# Patient Record
Sex: MALE | Race: WHITE | NOT HISPANIC OR LATINO | URBAN - METROPOLITAN AREA
[De-identification: names, ages, dates, MRNs, and addresses within clinical notes are randomized per-mention and may not be internally consistent; named-entity substitution may affect disease eponyms.]

---

## 2017-04-21 ENCOUNTER — IMPORTED ENCOUNTER (OUTPATIENT)
Dept: URBAN - METROPOLITAN AREA CLINIC 9 | Facility: CLINIC | Age: 52
End: 2017-04-21

## 2017-05-23 ENCOUNTER — IMPORTED ENCOUNTER (OUTPATIENT)
Dept: URBAN - METROPOLITAN AREA CLINIC 9 | Facility: CLINIC | Age: 52
End: 2017-05-23

## 2017-11-21 ENCOUNTER — IMPORTED ENCOUNTER (OUTPATIENT)
Dept: URBAN - METROPOLITAN AREA CLINIC 9 | Facility: CLINIC | Age: 52
End: 2017-11-21

## 2018-05-22 ENCOUNTER — IMPORTED ENCOUNTER (OUTPATIENT)
Dept: URBAN - METROPOLITAN AREA CLINIC 9 | Facility: CLINIC | Age: 53
End: 2018-05-22

## 2018-05-29 ENCOUNTER — IMPORTED ENCOUNTER (OUTPATIENT)
Dept: URBAN - METROPOLITAN AREA CLINIC 9 | Facility: CLINIC | Age: 53
End: 2018-05-29

## 2018-11-20 ENCOUNTER — IMPORTED ENCOUNTER (OUTPATIENT)
Dept: URBAN - METROPOLITAN AREA CLINIC 9 | Facility: CLINIC | Age: 53
End: 2018-11-20

## 2019-05-30 ENCOUNTER — IMPORTED ENCOUNTER (OUTPATIENT)
Dept: URBAN - METROPOLITAN AREA CLINIC 9 | Facility: CLINIC | Age: 54
End: 2019-05-30

## 2019-06-21 ENCOUNTER — IMPORTED ENCOUNTER (OUTPATIENT)
Dept: URBAN - METROPOLITAN AREA CLINIC 9 | Facility: CLINIC | Age: 54
End: 2019-06-21

## 2019-12-17 ENCOUNTER — IMPORTED ENCOUNTER (OUTPATIENT)
Dept: URBAN - METROPOLITAN AREA CLINIC 9 | Facility: CLINIC | Age: 54
End: 2019-12-17

## 2020-06-30 ENCOUNTER — IMPORTED ENCOUNTER (OUTPATIENT)
Dept: URBAN - METROPOLITAN AREA CLINIC 9 | Facility: CLINIC | Age: 55
End: 2020-06-30

## 2020-11-25 ENCOUNTER — IMPORTED ENCOUNTER (OUTPATIENT)
Dept: URBAN - METROPOLITAN AREA CLINIC 9 | Facility: CLINIC | Age: 55
End: 2020-11-25

## 2021-01-12 ENCOUNTER — IMPORTED ENCOUNTER (OUTPATIENT)
Dept: URBAN - METROPOLITAN AREA CLINIC 9 | Facility: CLINIC | Age: 56
End: 2021-01-12

## 2021-07-13 ENCOUNTER — IMPORTED ENCOUNTER (OUTPATIENT)
Dept: URBAN - METROPOLITAN AREA CLINIC 9 | Facility: CLINIC | Age: 56
End: 2021-07-13

## 2021-09-30 ENCOUNTER — IMPORTED ENCOUNTER (OUTPATIENT)
Dept: URBAN - METROPOLITAN AREA CLINIC 9 | Facility: CLINIC | Age: 56
End: 2021-09-30

## 2021-10-18 ASSESSMENT — KERATOMETRY
OD_K1POWER_DIOPTERS: 42
OS_K1POWER_DIOPTERS: 42.25
OS_K1POWER_DIOPTERS: 42.25
OD_K2POWER_DIOPTERS: 42.625
OS_K2POWER_DIOPTERS: 43
OD_K2POWER_DIOPTERS: 42.75
OS_AXISANGLE2_DEGREES: 113
OD_K2POWER_DIOPTERS: 42.75
OS_AXISANGLE_DEGREES: 23
OD_AXISANGLE_DEGREES: 155
OD_AXISANGLE2_DEGREES: 66
OS_AXISANGLE_DEGREES: 21
OD_AXISANGLE_DEGREES: 150
OD_AXISANGLE_DEGREES: 156
OS_K2POWER_DIOPTERS: 42.875
OD_AXISANGLE2_DEGREES: 65
OS_AXISANGLE_DEGREES: 23
OS_AXISANGLE2_DEGREES: 111
OD_K1POWER_DIOPTERS: 41.875
OD_AXISANGLE2_DEGREES: 60
OD_K1POWER_DIOPTERS: 42.25
OS_K1POWER_DIOPTERS: 42.25
OS_AXISANGLE2_DEGREES: 113
OS_K2POWER_DIOPTERS: 42.75

## 2021-10-18 ASSESSMENT — TONOMETRY
OS_IOP_MMHG: 16
OS_IOP_MMHG: 21
OS_IOP_MMHG: 15
OD_IOP_MMHG: 16
OD_IOP_MMHG: 15
OS_IOP_MMHG: 17
OS_IOP_MMHG: 17
OS_IOP_MMHG: 16
OD_IOP_MMHG: 15
OD_IOP_MMHG: 16
OS_IOP_MMHG: 13
OD_IOP_MMHG: 13
OD_IOP_MMHG: 17
OD_IOP_MMHG: 16
OD_IOP_MMHG: 17
OD_IOP_MMHG: 13
OS_IOP_MMHG: 20
OS_IOP_MMHG: 16
OS_IOP_MMHG: 19
OD_IOP_MMHG: 19
OD_IOP_MMHG: 16
OS_IOP_MMHG: 19
OS_IOP_MMHG: 16
OS_IOP_MMHG: 19
OD_IOP_MMHG: 17
OD_IOP_MMHG: 17

## 2021-10-18 ASSESSMENT — VISUAL ACUITY
OS_CC: 20/50 - SN
OD_CC: 20/30 + SN
OD_CC: 20/40 -2 SN
OD_CC: 20/40 - SN
OS_CC: 20/50 - SN
OD_CC: 20/40 SN
OS_CC: 20/50 - SN
OS_CC: 20/50 SN
OD_CC: 20/40 -2 SN
OS_CC: 20/70 SN
OS_CC: 20/60 - SN
OD_CC: 20/60 SN
OS_CC: 20/50 + SN
OS_CC: 20/60 - SN
OS_CC: 20/60 SN
OD_CC: 20/30 - SN
OS_CC: 20/50 - SN
OD_CC: 20/30 SN
OD_CC: 20/40 SN
OD_CC: 20/40 -2 SN
OD_CC: 20/30 - SN
OD_CC: 20/40 -2 SN
OS_CC: 20/60 SN
OS_CC: 20/50 -2 SN
OD_CC: 20/50 + SN
OS_CC: 20/50 -2 SN
OD_CC: 20/40 SN
OS_CC: 20/60 - SN
OD_CC: 20/30 - SN
OS_CC: 20/50 - SN

## 2021-10-18 ASSESSMENT — PACHYMETRY
OD_CT_UM: 659.0
OS_CT_UM: 579.0

## 2022-01-25 ENCOUNTER — ESTABLISHED PATIENT (OUTPATIENT)
Dept: URBAN - NONMETROPOLITAN AREA CLINIC 6 | Facility: CLINIC | Age: 57
End: 2022-01-25

## 2022-01-25 DIAGNOSIS — H35.3132: ICD-10-CM

## 2022-01-25 DIAGNOSIS — H25.13: ICD-10-CM

## 2022-01-25 PROCEDURE — 92014 COMPRE OPH EXAM EST PT 1/>: CPT

## 2022-01-25 PROCEDURE — 92134 CPTRZ OPH DX IMG PST SGM RTA: CPT

## 2022-01-25 ASSESSMENT — KERATOMETRY
OS_AXISANGLE2_DEGREES: 113
OS_AXISANGLE_DEGREES: 23
OD_AXISANGLE2_DEGREES: 66
OD_K1POWER_DIOPTERS: 42.25
OS_K2POWER_DIOPTERS: 42.75
OS_K1POWER_DIOPTERS: 42.25
OD_K2POWER_DIOPTERS: 42.75
OD_AXISANGLE_DEGREES: 156

## 2022-01-25 ASSESSMENT — TONOMETRY
OS_IOP_MMHG: 14
OD_IOP_MMHG: 13

## 2022-01-25 ASSESSMENT — VISUAL ACUITY
OS_CC: 20/50
OD_CC: 20/30

## 2022-04-25 NOTE — PATIENT DISCUSSION
04/25/2022 WJS: No significant visual opacity. Patient to come back in a year for a comprehensive exam. Monitor.

## 2022-08-23 ENCOUNTER — ESTABLISHED PATIENT (OUTPATIENT)
Dept: URBAN - NONMETROPOLITAN AREA CLINIC 6 | Facility: CLINIC | Age: 57
End: 2022-08-23

## 2022-08-23 DIAGNOSIS — H25.13: ICD-10-CM

## 2022-08-23 DIAGNOSIS — H35.3132: ICD-10-CM

## 2022-08-23 PROCEDURE — 92134 CPTRZ OPH DX IMG PST SGM RTA: CPT

## 2022-08-23 PROCEDURE — 92014 COMPRE OPH EXAM EST PT 1/>: CPT

## 2022-08-23 ASSESSMENT — VISUAL ACUITY
OS_CC: 20/60
OD_CC: 20/40

## 2022-08-23 ASSESSMENT — KERATOMETRY
OD_AXISANGLE2_DEGREES: 66
OD_K1POWER_DIOPTERS: 42.25
OS_AXISANGLE_DEGREES: 23
OD_K2POWER_DIOPTERS: 42.75
OS_K1POWER_DIOPTERS: 42.25
OS_AXISANGLE2_DEGREES: 113
OD_AXISANGLE_DEGREES: 156
OS_K2POWER_DIOPTERS: 42.75

## 2022-08-23 ASSESSMENT — TONOMETRY
OS_IOP_MMHG: 15
OD_IOP_MMHG: 15

## 2022-10-11 ENCOUNTER — COMPREHENSIVE EXAM (OUTPATIENT)
Dept: URBAN - NONMETROPOLITAN AREA CLINIC 6 | Facility: CLINIC | Age: 57
End: 2022-10-11

## 2022-10-11 DIAGNOSIS — H47.012: ICD-10-CM

## 2022-10-11 DIAGNOSIS — H40.013: ICD-10-CM

## 2022-10-11 DIAGNOSIS — H46.8: ICD-10-CM

## 2022-10-11 DIAGNOSIS — H35.3132: ICD-10-CM

## 2022-10-11 DIAGNOSIS — H25.13: ICD-10-CM

## 2022-10-11 PROCEDURE — 92014 COMPRE OPH EXAM EST PT 1/>: CPT

## 2022-10-11 PROCEDURE — 92133 CPTRZD OPH DX IMG PST SGM ON: CPT

## 2022-10-11 ASSESSMENT — KERATOMETRY
OS_K1POWER_DIOPTERS: 42.50
OD_K1POWER_DIOPTERS: 42.00
OS_AXISANGLE2_DEGREES: 124
OS_AXISANGLE_DEGREES: 34
OD_K2POWER_DIOPTERS: 42.75
OD_AXISANGLE2_DEGREES: 47
OS_K2POWER_DIOPTERS: 42.75
OD_AXISANGLE_DEGREES: 137

## 2022-10-11 ASSESSMENT — TONOMETRY
OD_IOP_MMHG: 20
OS_IOP_MMHG: 20

## 2022-10-11 ASSESSMENT — VISUAL ACUITY
OU_CC: 20/30-1
OD_CC: 20/30
OD_SC: 20/80
OD_PH: 20/40
OS_SC: 20/50
OS_CC: 20/50-1
OU_SC: 20/50

## 2022-11-23 ENCOUNTER — DIAGNOSTICS ONLY (OUTPATIENT)
Dept: URBAN - NONMETROPOLITAN AREA CLINIC 6 | Facility: CLINIC | Age: 57
End: 2022-11-23

## 2022-11-23 DIAGNOSIS — H40.013: ICD-10-CM

## 2022-11-23 PROCEDURE — 92083 EXTENDED VISUAL FIELD XM: CPT

## 2022-11-23 PROCEDURE — 92250 FUNDUS PHOTOGRAPHY W/I&R: CPT

## 2022-11-23 ASSESSMENT — KERATOMETRY
OD_K2POWER_DIOPTERS: 42.75
OD_AXISANGLE_DEGREES: 137
OS_AXISANGLE2_DEGREES: 124
OD_K1POWER_DIOPTERS: 42.00
OD_AXISANGLE2_DEGREES: 47
OS_K1POWER_DIOPTERS: 42.50
OS_AXISANGLE_DEGREES: 34
OS_K2POWER_DIOPTERS: 42.75

## 2022-11-23 ASSESSMENT — TONOMETRY
OD_IOP_MMHG: 15
OS_IOP_MMHG: 16

## 2023-09-05 ENCOUNTER — ESTABLISHED PATIENT (OUTPATIENT)
Dept: URBAN - NONMETROPOLITAN AREA CLINIC 6 | Facility: CLINIC | Age: 58
End: 2023-09-05

## 2023-09-05 DIAGNOSIS — H25.13: ICD-10-CM

## 2023-09-05 DIAGNOSIS — H46.8: ICD-10-CM

## 2023-09-05 DIAGNOSIS — H35.3132: ICD-10-CM

## 2023-09-05 PROCEDURE — 92014 COMPRE OPH EXAM EST PT 1/>: CPT

## 2023-09-05 PROCEDURE — 92250 FUNDUS PHOTOGRAPHY W/I&R: CPT

## 2023-09-05 ASSESSMENT — TONOMETRY
OS_IOP_MMHG: 12
OD_IOP_MMHG: 12

## 2023-09-05 ASSESSMENT — VISUAL ACUITY
OD_CC: 20/30-1
OS_CC: 20/70+1
OU_CC: 20/30-1

## 2024-03-19 ENCOUNTER — ESTABLISHED PATIENT (OUTPATIENT)
Dept: URBAN - NONMETROPOLITAN AREA CLINIC 6 | Facility: CLINIC | Age: 59
End: 2024-03-19

## 2024-03-19 DIAGNOSIS — H25.13: ICD-10-CM

## 2024-03-19 DIAGNOSIS — H35.3132: ICD-10-CM

## 2024-03-19 DIAGNOSIS — H46.8: ICD-10-CM

## 2024-03-19 PROCEDURE — 92134 CPTRZ OPH DX IMG PST SGM RTA: CPT

## 2024-03-19 PROCEDURE — 99214 OFFICE O/P EST MOD 30 MIN: CPT

## 2024-03-19 ASSESSMENT — VISUAL ACUITY
OD_CC: 20/25-1
OS_CC: 20/50-2

## 2024-03-19 ASSESSMENT — TONOMETRY
OS_IOP_MMHG: 15
OD_IOP_MMHG: 13

## 2024-10-01 ENCOUNTER — COMPREHENSIVE EXAM (OUTPATIENT)
Dept: URBAN - NONMETROPOLITAN AREA CLINIC 6 | Facility: CLINIC | Age: 59
End: 2024-10-01

## 2024-10-01 DIAGNOSIS — H25.13: ICD-10-CM

## 2024-10-01 DIAGNOSIS — H46.8: ICD-10-CM

## 2024-10-01 DIAGNOSIS — H35.3132: ICD-10-CM

## 2024-10-01 PROCEDURE — 92250 FUNDUS PHOTOGRAPHY W/I&R: CPT

## 2024-10-01 PROCEDURE — 99214 OFFICE O/P EST MOD 30 MIN: CPT

## 2025-04-01 ENCOUNTER — COMPREHENSIVE EXAM (OUTPATIENT)
Age: 60
End: 2025-04-01

## 2025-04-01 DIAGNOSIS — H46.8: ICD-10-CM

## 2025-04-01 DIAGNOSIS — H25.13: ICD-10-CM

## 2025-04-01 DIAGNOSIS — H35.3132: ICD-10-CM

## 2025-04-01 PROCEDURE — 92134 CPTRZ OPH DX IMG PST SGM RTA: CPT

## 2025-04-01 PROCEDURE — 99214 OFFICE O/P EST MOD 30 MIN: CPT
